# Patient Record
Sex: FEMALE | Race: WHITE | NOT HISPANIC OR LATINO | Employment: FULL TIME | RURAL
[De-identification: names, ages, dates, MRNs, and addresses within clinical notes are randomized per-mention and may not be internally consistent; named-entity substitution may affect disease eponyms.]

---

## 2023-09-22 ENCOUNTER — OFFICE VISIT (OUTPATIENT)
Dept: FAMILY MEDICINE CLINIC | Facility: CLINIC | Age: 48
End: 2023-09-22
Payer: MEDICAID

## 2023-09-22 VITALS
OXYGEN SATURATION: 98 % | WEIGHT: 182 LBS | HEIGHT: 67 IN | BODY MASS INDEX: 28.56 KG/M2 | HEART RATE: 81 BPM | DIASTOLIC BLOOD PRESSURE: 96 MMHG | SYSTOLIC BLOOD PRESSURE: 160 MMHG

## 2023-09-22 DIAGNOSIS — F41.1 GENERALIZED ANXIETY DISORDER: ICD-10-CM

## 2023-09-22 DIAGNOSIS — Z00.00 ROUTINE MEDICAL EXAM: Primary | ICD-10-CM

## 2023-09-22 DIAGNOSIS — E56.9 VITAMIN DEFICIENCY: ICD-10-CM

## 2023-09-22 DIAGNOSIS — E55.9 VITAMIN D DEFICIENCY: ICD-10-CM

## 2023-09-22 DIAGNOSIS — Z13.220 SCREENING FOR LIPID DISORDERS: ICD-10-CM

## 2023-09-22 DIAGNOSIS — M25.50 ARTHRALGIA, UNSPECIFIED JOINT: ICD-10-CM

## 2023-09-22 DIAGNOSIS — M25.552 HIP PAIN, LEFT: ICD-10-CM

## 2023-09-22 DIAGNOSIS — Z13.1 SCREENING FOR DIABETES MELLITUS: ICD-10-CM

## 2023-09-22 RX ORDER — PROPRANOLOL HYDROCHLORIDE 20 MG/1
20 TABLET ORAL 2 TIMES DAILY PRN
Qty: 60 TABLET | Refills: 1 | Status: SHIPPED | OUTPATIENT
Start: 2023-09-22

## 2023-09-22 RX ORDER — PROPRANOLOL HYDROCHLORIDE 20 MG/1
TABLET ORAL
Qty: 180 TABLET | OUTPATIENT
Start: 2023-09-22

## 2023-09-22 RX ORDER — PREDNISONE 20 MG/1
TABLET ORAL
Qty: 18 TABLET | Refills: 0 | Status: SHIPPED | OUTPATIENT
Start: 2023-09-22

## 2023-09-22 NOTE — PROGRESS NOTES
"Chief Complaint  Hip Pain, Establish Care, and Annual Exam    Subjective          Mirta Love presents to [facility] for preventative yearly exam.   History of Present Illness  Pt is here for a physical exam and to establish care. She has had left hip pain for the past 4 months. She denies known injury. She has tenderness in other joints at times. She has had anxiety for the past few months. Her father had a significant MI in his 30s.    Hip Pain       Objective   Vital Signs:   /96   Pulse 81   Ht 170.2 cm (67\")   Wt 82.6 kg (182 lb)   SpO2 98%   BMI 28.51 kg/m²     Body mass index is 28.51 kg/m².    Predictive Model Details   No score data available for Risk of Fall        PHQ-9 Depression Screening  Little interest or pleasure in doing things?     Feeling down, depressed, or hopeless?     Trouble falling or staying asleep, or sleeping too much?     Feeling tired or having little energy?     Poor appetite or overeating?     Feeling bad about yourself - or that you are a failure or have let yourself or your family down?     Trouble concentrating on things, such as reading the newspaper or watching television?     Moving or speaking so slowly that other people could have noticed? Or the opposite - being so fidgety or restless that you have been moving around a lot more than usual?     Thoughts that you would be better off dead, or of hurting yourself in some way?     PHQ-9 Total Score     If you checked off any problems, how difficult have these problems made it for you to do your work, take care of things at home, or get along with other people?       Health Maintenance   Topic Date Due    BMI FOLLOWUP  Never done    COLORECTAL CANCER SCREENING  Never done    TDAP/TD VACCINES (1 - Tdap) Never done    COVID-19 Vaccine (3 - Moderna series) 03/16/2021    HEPATITIS C SCREENING  Never done    ANNUAL PHYSICAL  Never done    PAP SMEAR  Never done    INFLUENZA VACCINE  " 10/01/2023    Pneumococcal Vaccine 0-64  Aged Out        Immunization History   Administered Date(s) Administered    COVID-19 (MODERNA) 1st,2nd,3rd Dose Monovalent 2020, 2021       Review of Systems   Constitutional:  Negative for fatigue and fever.   Respiratory:  Negative for shortness of breath.    Cardiovascular:  Negative for chest pain, palpitations and leg swelling.   Musculoskeletal:  Positive for arthralgias.   Neurological:  Negative for syncope.   Psychiatric/Behavioral:  The patient is nervous/anxious.       Past History:  Medical History: has a past medical history of Anxiety ( or ), Diverticulosis (), and History of medical problems.   Surgical History: has a past surgical history that includes  section (07) and Colonoscopy ().   Family History: family history includes Anxiety disorder in her mother; Arthritis in her father and mother; Asthma in her daughter; Cancer in her mother; Heart disease in her father; Hyperlipidemia in her father.   Social History: reports that she has never smoked. She has never used smokeless tobacco. She reports current alcohol use of about 2.0 standard drinks per week. She reports that she does not use drugs.       Allergies: Patient has no known allergies.    Physical Exam  Constitutional:       Appearance: Normal appearance.   HENT:      Head: Normocephalic.   Eyes:      Conjunctiva/sclera: Conjunctivae normal.      Pupils: Pupils are equal, round, and reactive to light.   Cardiovascular:      Rate and Rhythm: Normal rate and regular rhythm.      Heart sounds: Normal heart sounds.   Pulmonary:      Effort: Pulmonary effort is normal.      Breath sounds: Normal breath sounds.   Abdominal:      Tenderness: There is no abdominal tenderness.   Musculoskeletal:         General: Normal range of motion.      Comments: Left hip tenderness   Skin:     General: Skin is warm and dry.      Capillary Refill: Capillary refill takes less than 2  seconds.   Neurological:      General: No focal deficit present.      Mental Status: She is alert and oriented to person, place, and time.   Psychiatric:         Mood and Affect: Mood normal.         Behavior: Behavior normal.         Thought Content: Thought content normal.         Judgment: Judgment normal.        Result Review :                   Assessment and Plan    Diagnoses and all orders for this visit:    1. Routine medical exam (Primary)  Comments:  We discussed diet, exercise, and prev counseling. Labs ordered. UTD on pap/mammo.  Orders:  -     CBC & Differential  -     Comprehensive Metabolic Panel  -     TSH    2. Screening for diabetes mellitus  -     Hemoglobin A1c    3. Screening for lipid disorders  -     Lipid Panel    4. Vitamin deficiency  -     Vitamin B12  -     Folate    5. Vitamin D deficiency  -     Vitamin D,25-Hydroxy    6. Arthralgia, unspecified joint  Comments:  Labs ordered.  Orders:  -     SARANYA  -     Rheumatoid Factor    7. Hip pain, left  Comments:  XRs done. Finish prednisone. Apply ice to painful areas and ROM stretching. We may order an MRI if not improving.  Orders:  -     XR Hip With or Without Pelvis 2 - 3 View Left; Future  -     predniSONE (DELTASONE) 20 MG tablet; 3 QD x 3 days, 2 QD x 3 days, 1 QD x 3 days  Dispense: 18 tablet; Refill: 0    8. Generalized anxiety disorder  Comments:  Try Propranolol PRN. We may try a maintenance medication if not improving.  Orders:  -     propranolol (INDERAL) 20 MG tablet; Take 1 tablet by mouth 2 (Two) Times a Day As Needed (anxiety).  Dispense: 60 tablet; Refill: 1                Current Outpatient Medications:     predniSONE (DELTASONE) 20 MG tablet, 3 QD x 3 days, 2 QD x 3 days, 1 QD x 3 days, Disp: 18 tablet, Rfl: 0    propranolol (INDERAL) 20 MG tablet, Take 1 tablet by mouth 2 (Two) Times a Day As Needed (anxiety)., Disp: 60 tablet, Rfl: 1    Follow Up   Return in about 1 month (around 10/22/2023) for if not improving or sooner if  symptoms worsen.  Patient was given instructions and counseling regarding her condition or for health maintenance advice. Please see specific information pulled into the AVS if appropriate.     Keyona Mitchell, APRN

## 2023-09-25 DIAGNOSIS — N95.1 MENOPAUSAL SYMPTOMS: Primary | ICD-10-CM

## 2023-10-03 ENCOUNTER — PATIENT MESSAGE (OUTPATIENT)
Dept: FAMILY MEDICINE CLINIC | Facility: CLINIC | Age: 48
End: 2023-10-03
Payer: MEDICAID